# Patient Record
Sex: FEMALE | Race: BLACK OR AFRICAN AMERICAN | URBAN - METROPOLITAN AREA
[De-identification: names, ages, dates, MRNs, and addresses within clinical notes are randomized per-mention and may not be internally consistent; named-entity substitution may affect disease eponyms.]

---

## 2019-01-07 ENCOUNTER — OFFICE VISIT (OUTPATIENT)
Dept: INTERNAL MEDICINE CLINIC | Age: 59
End: 2019-01-07

## 2019-01-07 VITALS
SYSTOLIC BLOOD PRESSURE: 178 MMHG | RESPIRATION RATE: 16 BRPM | HEIGHT: 66 IN | WEIGHT: 188.9 LBS | BODY MASS INDEX: 30.36 KG/M2 | DIASTOLIC BLOOD PRESSURE: 100 MMHG | OXYGEN SATURATION: 98 % | TEMPERATURE: 97.8 F | HEART RATE: 83 BPM

## 2019-01-07 DIAGNOSIS — K13.0 LIP LESION: ICD-10-CM

## 2019-01-07 DIAGNOSIS — Z56.6 STRESS AT WORK: ICD-10-CM

## 2019-01-07 DIAGNOSIS — Z00.00 WELLNESS EXAMINATION: Primary | ICD-10-CM

## 2019-01-07 PROBLEM — I10 HTN (HYPERTENSION): Status: ACTIVE | Noted: 2019-01-07

## 2019-01-07 RX ORDER — AMLODIPINE BESYLATE 5 MG/1
TABLET ORAL
Qty: 90 TAB | Refills: 11 | Status: SHIPPED | OUTPATIENT
Start: 2019-01-07

## 2019-01-07 RX ORDER — LATANOPROST 50 UG/ML
1 SOLUTION/ DROPS OPHTHALMIC
COMMUNITY

## 2019-01-07 RX ORDER — AMLODIPINE BESYLATE 5 MG/1
5 TABLET ORAL DAILY
Qty: 30 TAB | Refills: 11 | Status: SHIPPED | OUTPATIENT
Start: 2019-01-07 | End: 2019-01-07 | Stop reason: SDUPTHER

## 2019-01-07 RX ORDER — LEVOTHYROXINE SODIUM 125 UG/1
TABLET ORAL
COMMUNITY
End: 2020-07-31 | Stop reason: SDUPTHER

## 2019-01-07 SDOH — HEALTH STABILITY - MENTAL HEALTH: OTHER PHYSICAL AND MENTAL STRAIN RELATED TO WORK: Z56.6

## 2019-01-07 NOTE — LETTER
NOTIFICATION RETURN TO WORK / SCHOOL 
 
1/7/2019 6:29 PM 
 
[facility]. Julio C[facility]ar [facility]atch [facility] [facility] [address] [facility] [id number] To Whom It May Concern: 
 
Julio César Hatch is currently under the care of Geo Mann and is incapacitated 01/07/2019 to 03/10/2019. She will return to work/school on 03/11/2019. If there are questions or concerns please have the patient contact our office. Sincerely, Jose West MD

## 2019-01-07 NOTE — LETTER
NOTIFICATION RETURN TO WORK / SCHOOL 
 
1/7/2019 6:24 PM 
 
Ms. Ashley Raygoza [address] [place] [place] [id number] To Whom It May Concern: 
 
Ashley Raygoza is currently under the care of Geo Mann on 01/07/2019 to 03/10/2019 She will return to work/school on 03/11/2019. If there are questions or concerns please have the patient contact our office. Sincerely, Estefanía Villanueva MD

## 2019-01-07 NOTE — PROGRESS NOTES
SPORTS MEDICINE AND PRIMARY CARE Eryn Vasquez MD, [address] [address] Phone:  [phone]  Fax: [phone] Chief Complaint Patient presents with  New Patient  
  patient concerned about stress and needs to be out of work for a while SUBJECTIVE: 
 
Floyd Marte is a 63 y.o. female Patient is seen today as a new patient for evaluation and ongoing care. Patient complains of stress related to work. She states she has 19 hours of sick leave and she usually manages her stress well, but with her new supervisor she is unable to manage the stress. She says that other employees have the same concern about the manager. She states she completes her work in a timely fashion and in spite of this has a great deal of stress related to the manager. She complains of a stress headache with tension in the frontal area. She notes increase in eating related to the stress. She is also becoming excessively sleepy, which she also relates to stress. She states she needs time off of work because of the severity of stress. Patient is seen for evaluation. Current Outpatient Medications Medication Sig Dispense Refill  levothyroxine (SYNTHROID) 125 mcg tablet Take  by mouth Daily (before breakfast).  latanoprost (XALATAN) 0.005 % ophthalmic solution Administer 1 Drop to both eyes nightly.  amLODIPine (NORVASC) 5 mg tablet Take 1 Tab by mouth daily. 30 Tab 11 Past Medical History:  
Diagnosis Date  Glaucoma   
 HTN (hypertension) 01/07/2019  Lesion of lip 01/07/2019  Stress at work 01/07/2019  Thyroid disease  Wellness examination 01/07/2019 History reviewed. No pertinent surgical history. No Known Allergies REVIEW OF SYSTEMS: 
General: negative for - chills or fever ENT: negative for - headaches, nasal congestion or tinnitus Respiratory: negative for - cough, hemoptysis, shortness of breath or wheezing Cardiovascular : negative for - chest pain, edema, palpitations or shortness of breath Gastrointestinal: negative for - abdominal pain, blood in stools, heartburn or nausea/vomiting Genito-Urinary: no dysuria, trouble voiding, or hematuria Musculoskeletal: negative for - gait disturbance, joint pain, joint stiffness or joint swelling Neurological: no TIA or stroke symptoms Hematologic: no bruises, no bleeding, no swollen glands Integument: no lumps, mole changes, nail changes or rash Endocrine:no malaise/lethargy or unexpected weight changes Social History Socioeconomic History  Marital status: SINGLE Spouse name: Not on file  Number of children: Not on file  Years of education: Not on file  Highest education level: Not on file Tobacco Use  Smoking status: Never Smoker  Smokeless tobacco: Never Used Substance and Sexual Activity  Drug use: No  
 Sexual activity: Yes No family history on file. Habits:  Patient is a lifetime nonsmoker, non drinker, non drug abuser. Social History:  The patient is  since 89. She has a 29year old son and an 25year old daughter and one grandchild. She completed her bachelor's degree at [employer] in business management. 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 She is a member of [employer]. Family History:  Father is 80 with PTSD, diabetes, and is at the [facility] currently. Mother is 68 with a stroke and hypertension. One sister, one brother, alive and well. OBJECTIVE:  
 
Visit Vitals BP (!) 178/100 Pulse 83 Temp 97.8 °F (36.6 °C) (Oral) Resp 16 Ht 5' 6\" (1.676 m) Wt 188 lb 14.4 oz (85.7 kg) SpO2 98% BMI 30.49 kg/m² CONSTITUTIONAL: well , well nourished, appears age appropriate EYES: perrla, eom intact ENMT:moist mucous membranes, pharynx clear NECK: supple.  Thyroid normal 
 RESPIRATORY: Chest: clear bilaterally CARDIOVASCULAR: Heart: regular rate and rhythm GASTROINTESTINAL: Abdomen: soft, bowel sounds active HEMATOLOGIC: no pathological lymph nodes palpated MUSCULOSKELETAL: Extremities: no edema, pulse 1+ INTEGUMENT: No unusual rashes or suspicious skin lesions noted. Nails appear normal. 
NEUROLOGIC: non-focal exam  
MENTAL STATUS: alert and oriented, appropriate affect No results found for any previous visit. ASSESSMENT:  
1. Wellness examination 2. Stress at work 3. Lip lesion Patient has a stress disorder related to work. Even talking about it causes her blood pressure to rise and when I check her blood pressure it is 194/92. We will place her on Amlodipine, although the medication may need to be decreased. If the stress is resolved by her staying off of work it may allow the blood pressure to approach a normotensive range. She has a pigmentary lesion on the upper lip to the right. I suspect this is trauma, but if it does not resolve we suggest she see a dermatologist. 
 
For the stress disorder we will refer her to psychiatry. I think we should keep her off work for at least two months to control her blood pressure, at which point if she needs to stay off longer because of stress she will need to get those notes from the psychiatrist.  Patient understands our position and agrees. She will return to the office in one month. We suggest she check her blood pressure once a week and if it is consistently greater than 160 she will see us sooner than one month. Appropriate laboratory studies will be requested. I have discussed the diagnosis with the patient and the intended plan as seen in the 
orders above. The patient understands and agees with the plan. The patient has  
received an after visit summary and questions were answered concerning 
future plans Patient labs and/or xrays were reviewed Past records were reviewed. PLAN: 
. Orders Placed This Encounter  ROSAMARIA MAMMO BI SCREENING INCL CAD  
 URINALYSIS W/ RFLX MICROSCOPIC  CBC WITH AUTOMATED DIFF  
 METABOLIC PANEL, COMPREHENSIVE  LIPID PANEL  
 TSH 3RD GENERATION  
 HEMOGLOBIN A1C WITH EAG  
 REFERRAL TO PSYCHIATRY  REFERRAL TO DERMATOLOGY  levothyroxine (SYNTHROID) 125 mcg tablet  latanoprost (XALATAN) 0.005 % ophthalmic solution  amLODIPine (NORVASC) 5 mg tablet Follow-up Disposition: 
Return in about 4 weeks (around 2/4/2019). ATTENTION:  
This medical record was transcribed using an electronic medical records system. Although proofread, it may and can contain electronic and spelling errors. Other human spelling and other errors may be present. Corrections may be executed at a later time. Please feel free to contact us for any clarifications as needed.

## 2019-01-07 NOTE — PROGRESS NOTES
Chief Complaint Patient presents with  New Patient  
  patient concerned about stress and needs to be out of work for a while 1. Have you been to the ER, urgent care clinic since your last visit? Hospitalized since your last visit? No 
 
2. Have you seen or consulted any other health care providers outside of the [address] [place] [address] since your last visit? Include any pap smears or colon screening.  No

## 2019-01-09 LAB
ALBUMIN SERPL-MCNC: 3.9 G/DL (ref 3.5–5.5)
ALBUMIN/GLOB SERPL: 1.3 {RATIO} (ref 1.2–2.2)
ALP SERPL-CCNC: 72 IU/L (ref 39–117)
ALT SERPL-CCNC: 26 IU/L (ref 0–32)
APPEARANCE UR: CLEAR
AST SERPL-CCNC: 29 IU/L (ref 0–40)
BASOPHILS # BLD AUTO: 0 X10E3/UL (ref 0–0.2)
BASOPHILS NFR BLD AUTO: 0 %
BILIRUB SERPL-MCNC: 0.3 MG/DL (ref 0–1.2)
BILIRUB UR QL STRIP: NEGATIVE
BUN SERPL-MCNC: 12 MG/DL (ref 6–24)
BUN/CREAT SERPL: 14 (ref 9–23)
CALCIUM SERPL-MCNC: 9.2 MG/DL (ref 8.7–10.2)
CHLORIDE SERPL-SCNC: 102 MMOL/L (ref 96–106)
CHOLEST SERPL-MCNC: 177 MG/DL (ref 100–199)
CO2 SERPL-SCNC: 25 MMOL/L (ref 20–29)
COLOR UR: YELLOW
CREAT SERPL-MCNC: 0.83 MG/DL (ref 0.57–1)
EOSINOPHIL # BLD AUTO: 0.1 X10E3/UL (ref 0–0.4)
EOSINOPHIL NFR BLD AUTO: 2 %
ERYTHROCYTE [DISTWIDTH] IN BLOOD BY AUTOMATED COUNT: 13.3 % (ref 12.3–15.4)
EST. AVERAGE GLUCOSE BLD GHB EST-MCNC: 108 MG/DL
GLOBULIN SER CALC-MCNC: 3 G/DL (ref 1.5–4.5)
GLUCOSE SERPL-MCNC: 88 MG/DL (ref 65–99)
GLUCOSE UR QL: NEGATIVE
HBA1C MFR BLD: 5.4 % (ref 4.8–5.6)
HCT VFR BLD AUTO: 36.6 % (ref 34–46.6)
HDLC SERPL-MCNC: 61 MG/DL
HGB BLD-MCNC: 11.9 G/DL (ref 11.1–15.9)
HGB UR QL STRIP: NEGATIVE
IMM GRANULOCYTES # BLD AUTO: 0 X10E3/UL (ref 0–0.1)
IMM GRANULOCYTES NFR BLD AUTO: 0 %
KETONES UR QL STRIP: NEGATIVE
LDLC SERPL CALC-MCNC: 107 MG/DL (ref 0–99)
LEUKOCYTE ESTERASE UR QL STRIP: NEGATIVE
LYMPHOCYTES # BLD AUTO: 1.9 X10E3/UL (ref 0.7–3.1)
LYMPHOCYTES NFR BLD AUTO: 38 %
MCH RBC QN AUTO: 29.8 PG (ref 26.6–33)
MCHC RBC AUTO-ENTMCNC: 32.5 G/DL (ref 31.5–35.7)
MCV RBC AUTO: 92 FL (ref 79–97)
MICRO URNS: NORMAL
MONOCYTES # BLD AUTO: 0.5 X10E3/UL (ref 0.1–0.9)
MONOCYTES NFR BLD AUTO: 10 %
NEUTROPHILS # BLD AUTO: 2.5 X10E3/UL (ref 1.4–7)
NEUTROPHILS NFR BLD AUTO: 50 %
NITRITE UR QL STRIP: NEGATIVE
PH UR STRIP: 6.5 [PH] (ref 5–7.5)
PLATELET # BLD AUTO: 285 X10E3/UL (ref 150–379)
POTASSIUM SERPL-SCNC: 4 MMOL/L (ref 3.5–5.2)
PROT SERPL-MCNC: 6.9 G/DL (ref 6–8.5)
PROT UR QL STRIP: NEGATIVE
RBC # BLD AUTO: 3.99 X10E6/UL (ref 3.77–5.28)
SODIUM SERPL-SCNC: 139 MMOL/L (ref 134–144)
SP GR UR: 1.01 (ref 1–1.03)
TRIGL SERPL-MCNC: 44 MG/DL (ref 0–149)
TSH SERPL DL<=0.005 MIU/L-ACNC: 0.78 UIU/ML (ref 0.45–4.5)
UROBILINOGEN UR STRIP-MCNC: 0.2 MG/DL (ref 0.2–1)
VLDLC SERPL CALC-MCNC: 9 MG/DL (ref 5–40)
WBC # BLD AUTO: 5 X10E3/UL (ref 3.4–10.8)

## 2019-01-29 ENCOUNTER — OFFICE VISIT (OUTPATIENT)
Dept: INTERNAL MEDICINE CLINIC | Age: 59
End: 2019-01-29

## 2019-01-29 VITALS
TEMPERATURE: 98.2 F | OXYGEN SATURATION: 97 % | DIASTOLIC BLOOD PRESSURE: 97 MMHG | BODY MASS INDEX: 30.39 KG/M2 | RESPIRATION RATE: 18 BRPM | WEIGHT: 189.1 LBS | SYSTOLIC BLOOD PRESSURE: 168 MMHG | HEART RATE: 72 BPM | HEIGHT: 66 IN

## 2019-01-29 DIAGNOSIS — Z56.6 STRESS AT WORK: ICD-10-CM

## 2019-01-29 DIAGNOSIS — Z98.890 S/P COLONOSCOPY: ICD-10-CM

## 2019-01-29 DIAGNOSIS — I10 ESSENTIAL HYPERTENSION: Primary | ICD-10-CM

## 2019-01-29 RX ORDER — TRAZODONE HYDROCHLORIDE 50 MG/1
50 TABLET ORAL
Qty: 60 TAB | Refills: 3 | Status: SHIPPED | OUTPATIENT
Start: 2019-01-29 | End: 2019-01-29 | Stop reason: SDUPTHER

## 2019-01-29 RX ORDER — TRAZODONE HYDROCHLORIDE 50 MG/1
TABLET ORAL
Qty: 90 TAB | Refills: 3 | Status: SHIPPED | OUTPATIENT
Start: 2019-01-29

## 2019-01-29 SDOH — HEALTH STABILITY - MENTAL HEALTH: OTHER PHYSICAL AND MENTAL STRAIN RELATED TO WORK: Z56.6

## 2019-01-29 NOTE — PROGRESS NOTES
1. Have you been to the ER, urgent care clinic since your last visit? Hospitalized since your last visit? No 
 
2. Have you seen or consulted any other health care providers outside of the [address] [place] [address] since your last visit? Include any pap smears or colon screening. No  
 
Requesting medication for anxiety

## 2019-01-29 NOTE — PROGRESS NOTES
SPORTS MEDICINE AND PRIMARY CARE Robert Trivedi MD, [address] [address] Phone:  [phone]  Fax: [phone] Chief Complaint Patient presents with  Hypertension f/u SUBECTIVE: 
 
Baltazar Green is a 63 y.o. female Patient with known history of glaucoma, hypertension, off work currently due to stress related to work. Patient returns today still dealing with the stress disorder related to her supervisor. She states that whenever she talks to her  about it she feels full. She would like something for stress. She has an appointment to see Dr. Angelia Camilo in May. Current Outpatient Medications Medication Sig Dispense Refill  traZODone (DESYREL) 50 mg tablet Take 1 Tab by mouth nightly. 60 Tab 3  
 levothyroxine (SYNTHROID) 125 mcg tablet Take  by mouth Daily (before breakfast).  latanoprost (XALATAN) 0.005 % ophthalmic solution Administer 1 Drop to both eyes nightly.  amLODIPine (NORVASC) 5 mg tablet TAKE 1 TABLET BY MOUTH DAILY 90 Tab 11 Past Medical History:  
Diagnosis Date  Glaucoma   
 HTN (hypertension) 01/07/2019  Lesion of lip 01/07/2019  S/P colonoscopy 2012  
 at [place]  Stress at work 01/07/2019  Thyroid disease  Wellness examination 01/07/2019 History reviewed. No pertinent surgical history. No Known Allergies REVIEW OF SYSTEMS: 
 No chest pain, no shortness of breath. Social History Socioeconomic History  Marital status: SINGLE Spouse name: Not on file  Number of children: Not on file  Years of education: Not on file  Highest education level: Not on file Tobacco Use  Smoking status: Never Smoker  Smokeless tobacco: Never Used Substance and Sexual Activity  Drug use: No  
 Sexual activity: Yes  
Social History Narrative  Habits:  Patient is a lifetime nonsmoker, non drinker, non drug abuser.  
    
 Social History:  The patient is  since 89. She has a 29year old son and an 25year old daughter and one grandchild. She completed her bachelor's degree at [employer] in business management. 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 She is a member of [employer].  
    
 Family History:  Father is 80 with PTSD, diabetes, and is at the [facility] currently. Mother is 68 with a stroke and hypertension. One sister, one brother, alive and well.  
r 
History reviewed. No pertinent family history. OBJECTIVE: 
Visit Vitals BP (!) 168/97 Pulse 72 Temp 98.2 °F (36.8 °C) (Oral) Resp 18 Ht 5' 6\" (1.676 m) Wt 189 lb 1.6 oz (85.8 kg) SpO2 97% BMI 30.52 kg/m² ENT: perrla,  eom intact NECK: supple. Thyroid normal 
CHEST: clear to ascultation and percussion HEART: regular rate and rhythm ABD: soft, bowel sounds active EXTREMITIES: no edema, pulse 1+ Office Visit on 01/07/2019 Component Date Value Ref Range Status  Specific Gravity 01/07/2019 1.007  1.005 - 1.030 Final  
 pH (UA) 01/07/2019 6.5  5.0 - 7.5 Final  
 Color 01/07/2019 Yellow  Yellow Final  
 Appearance 01/07/2019 Clear  Clear Final  
 Leukocyte Esterase 01/07/2019 Negative  Negative Final  
 Protein 01/07/2019 Negative  Negative/Trace Final  
 Glucose 01/07/2019 Negative  Negative Final  
 Ketone 01/07/2019 Negative  Negative Final  
 Blood 01/07/2019 Negative  Negative Final  
 Bilirubin 01/07/2019 Negative  Negative Final  
 Urobilinogen 01/07/2019 0.2  0.2 - 1.0 mg/dL Final  
 Nitrites 01/07/2019 Negative  Negative Final  
 Microscopic Examination 01/07/2019 Comment   Final  
 Microscopic not indicated and not performed.   
 WBC 01/07/2019 5.0  3.4 - 10.8 x10E3/uL Final  
 RBC 01/07/2019 3.99  3.77 - 5.28 x10E6/uL Final  
 HGB 01/07/2019 11.9  11.1 - 15.9 g/dL Final  
 HCT 01/07/2019 36.6  34.0 - 46.6 % Final  
  MCV 01/07/2019 92  79 - 97 fL Final  
 MCH 01/07/2019 29.8  26.6 - 33.0 pg Final  
 MCHC 01/07/2019 32.5  31.5 - 35.7 g/dL Final  
 RDW 01/07/2019 13.3  12.3 - 15.4 % Final  
 PLATELET 43/77/9580 202  150 - 379 x10E3/uL Final  
 NEUTROPHILS 01/07/2019 50  Not Estab. % Final  
 Lymphocytes 01/07/2019 38  Not Estab. % Final  
 MONOCYTES 01/07/2019 10  Not Estab. % Final  
 EOSINOPHILS 01/07/2019 2  Not Estab. % Final  
 BASOPHILS 01/07/2019 0  Not Estab. % Final  
 ABS. NEUTROPHILS 01/07/2019 2.5  1.4 - 7.0 x10E3/uL Final  
 Abs Lymphocytes 01/07/2019 1.9  0.7 - 3.1 x10E3/uL Final  
 ABS. MONOCYTES 01/07/2019 0.5  0.1 - 0.9 x10E3/uL Final  
 ABS. EOSINOPHILS 01/07/2019 0.1  0.0 - 0.4 x10E3/uL Final  
 ABS. BASOPHILS 01/07/2019 0.0  0.0 - 0.2 x10E3/uL Final  
 IMMATURE GRANULOCYTES 01/07/2019 0  Not Estab. % Final  
 ABS. IMM. GRANS. 01/07/2019 0.0  0.0 - 0.1 x10E3/uL Final  
 Glucose 01/07/2019 88  65 - 99 mg/dL Final  
 BUN 01/07/2019 12  6 - 24 mg/dL Final  
 Creatinine 01/07/2019 0.83  0.57 - 1.00 mg/dL Final  
 GFR est non-AA 01/07/2019 79  >59 mL/min/1.73 Final  
 GFR est AA 01/07/2019 91  >59 mL/min/1.73 Final  
 BUN/Creatinine ratio 01/07/2019 14  9 - 23 Final  
 Sodium 01/07/2019 139  134 - 144 mmol/L Final  
 Potassium 01/07/2019 4.0  3.5 - 5.2 mmol/L Final  
 Chloride 01/07/2019 102  96 - 106 mmol/L Final  
 CO2 01/07/2019 25  20 - 29 mmol/L Final  
 Calcium 01/07/2019 9.2  8.7 - 10.2 mg/dL Final  
 Protein, total 01/07/2019 6.9  6.0 - 8.5 g/dL Final  
 Albumin 01/07/2019 3.9  3.5 - 5.5 g/dL Final  
 GLOBULIN, TOTAL 01/07/2019 3.0  1.5 - 4.5 g/dL Final  
 A-G Ratio 01/07/2019 1.3  1.2 - 2.2 Final  
 Bilirubin, total 01/07/2019 0.3  0.0 - 1.2 mg/dL Final  
 Alk.  phosphatase 01/07/2019 72  39 - 117 IU/L Final  
 AST (SGOT) 01/07/2019 29  0 - 40 IU/L Final  
 ALT (SGPT) 01/07/2019 26  0 - 32 IU/L Final  
 Cholesterol, total 01/07/2019 177  100 - 199 mg/dL Final  
  Triglyceride 01/07/2019 44  0 - 149 mg/dL Final  
 HDL Cholesterol 01/07/2019 61  >39 mg/dL Final  
 VLDL, calculated 01/07/2019 9  5 - 40 mg/dL Final  
 LDL, calculated 01/07/2019 107* 0 - 99 mg/dL Final  
 TSH 01/07/2019 0.781  0.450 - 4.500 uIU/mL Final  
 Hemoglobin A1c 01/07/2019 5.4  4.8 - 5.6 % Final  
 Comment:          Prediabetes: 5.7 - 6.4 Diabetes: >6.4 Glycemic control for adults with diabetes: <7.0  Estimated average glucose 01/07/2019 108  mg/dL Final  
 
  
 
ASSESSMENT: 
1. Essential hypertension 2. Stress at work 3. S/P colonoscopy Patient has typical white coat hypertension. She states that her blood pressure has been in the 120-130 range at home. Therefore no adjustment will be made in the medication. She is still dealing with the stresses at work. Will add Trazodone to her current regimen, asked her to take it at bedtime, which will also help her rest at night. She has an appointment to see us again the last part of February and at that time if an adjustment needs to be made in her medication we will make further adjustments. She is invited to walk in to see us at any time should she have an urgency and unable to get an appointment. I have discussed the diagnosis with the patient and the intended plan as seen in the 
orders above. The patient understands and agees with the plan. The patient has  
received an after visit summary and questions were answered concerning 
future plans Patient labs and/or xrays were reviewed Past records were reviewed. PLAN: 
. Orders Placed This Encounter  traZODone (DESYREL) 50 mg tablet Follow-up Disposition: 
Return in about 3 weeks (around 2/20/2019). ATTENTION:  
This medical record was transcribed using an electronic medical records system. Although proofread, it may and can contain electronic and spelling errors. Other human spelling and other errors may be present. Corrections may be executed at a later time. Please feel free to contact us for any clarifications as needed.

## 2019-02-26 ENCOUNTER — OFFICE VISIT (OUTPATIENT)
Dept: INTERNAL MEDICINE CLINIC | Age: 59
End: 2019-02-26

## 2019-02-26 VITALS
SYSTOLIC BLOOD PRESSURE: 148 MMHG | HEART RATE: 75 BPM | OXYGEN SATURATION: 97 % | TEMPERATURE: 98.1 F | RESPIRATION RATE: 18 BRPM | WEIGHT: 185.6 LBS | HEIGHT: 66 IN | DIASTOLIC BLOOD PRESSURE: 91 MMHG | BODY MASS INDEX: 29.83 KG/M2

## 2019-02-26 DIAGNOSIS — I10 ESSENTIAL HYPERTENSION: Primary | ICD-10-CM

## 2019-02-26 DIAGNOSIS — Z56.6 STRESS AT WORK: ICD-10-CM

## 2019-02-26 SDOH — HEALTH STABILITY - MENTAL HEALTH: OTHER PHYSICAL AND MENTAL STRAIN RELATED TO WORK: Z56.6

## 2019-02-26 NOTE — PROGRESS NOTES
SPORTS MEDICINE AND PRIMARY CARE Alem Khan MD, [address] [address] Phone:  [phone]  Fax: [phone] Chief Complaint Patient presents with  Hypertension SUBECTIVE: 
 
Tank Gloria is a 63 y.o. female Patient returns today with history of hypertension, thyroid disease, glaucoma, stress at work, and is seen for evaluation. States that she is not ready to go back to work. She does not see the psychiatrist until May 26th with  _____. She could not get an earlier appointment. She does not want to go back to work. Current Outpatient Medications Medication Sig Dispense Refill  traZODone (DESYREL) 50 mg tablet TAKE 1 TABLET BY MOUTH EVERY NIGHT 90 Tab 3  
 levothyroxine (SYNTHROID) 125 mcg tablet Take  by mouth Daily (before breakfast).  latanoprost (XALATAN) 0.005 % ophthalmic solution Administer 1 Drop to both eyes nightly.  amLODIPine (NORVASC) 5 mg tablet TAKE 1 TABLET BY MOUTH DAILY 90 Tab 11 Past Medical History:  
Diagnosis Date  Glaucoma   
 HTN (hypertension) 01/07/2019  Lesion of lip 01/07/2019  S/P colonoscopy 2012  
 at [place]  Stress at work 01/07/2019  Thyroid disease  Wellness examination 01/07/2019 History reviewed. No pertinent surgical history. No Known Allergies REVIEW OF SYSTEMS: 
 No chest pain, no shortness of breath. Social History Socioeconomic History  Marital status: SINGLE Spouse name: Not on file  Number of children: Not on file  Years of education: Not on file  Highest education level: Not on file Tobacco Use  Smoking status: Never Smoker  Smokeless tobacco: Never Used Substance and Sexual Activity  Alcohol use: No  
  Frequency: Never  Drug use: No  
 Sexual activity: Yes  
  Partners: Male Social History Narrative  Habits:  Patient is a lifetime nonsmoker, non drinker, non drug abuser.  
    
 Social History:  The patient is  since 89. She has a 29year old son and an 25year old daughter and one grandchild. 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 She is a member of [employer].  
    
 Family History:  Father is 80 with PTSD, diabetes, and is at the [facility] currently. Mother is 68 with a stroke and hypertension. One sister, one brother, alive and well.  
r 
Family History Problem Relation Age of Onset  Stroke Mother  Hypertension Mother  Hypertension Father  Diabetes Father OBJECTIVE: 
Visit Vitals BP (!) 148/91 Pulse 75 Temp 98.1 °F (36.7 °C) (Oral) Resp 18 Ht 5' 6\" (1.676 m) Wt 185 lb 9.6 oz (84.2 kg) SpO2 97% BMI 29.96 kg/m² ENT: perrla,  eom intact NECK: supple. Thyroid normal 
CHEST: clear to ascultation and percussion HEART: regular rate and rhythm ABD: soft, bowel sounds active EXTREMITIES: no edema, pulse 1+ Office Visit on 01/07/2019 Component Date Value Ref Range Status  Specific Gravity 01/07/2019 1.007  1.005 - 1.030 Final  
 pH (UA) 01/07/2019 6.5  5.0 - 7.5 Final  
 Color 01/07/2019 Yellow  Yellow Final  
 Appearance 01/07/2019 Clear  Clear Final  
 Leukocyte Esterase 01/07/2019 Negative  Negative Final  
 Protein 01/07/2019 Negative  Negative/Trace Final  
 Glucose 01/07/2019 Negative  Negative Final  
 Ketone 01/07/2019 Negative  Negative Final  
 Blood 01/07/2019 Negative  Negative Final  
 Bilirubin 01/07/2019 Negative  Negative Final  
 Urobilinogen 01/07/2019 0.2  0.2 - 1.0 mg/dL Final  
 Nitrites 01/07/2019 Negative  Negative Final  
 Microscopic Examination 01/07/2019 Comment   Final  
 Microscopic not indicated and not performed.   
 WBC 01/07/2019 5.0  3.4 - 10.8 x10E3/uL Final  
 RBC 01/07/2019 3.99  3.77 - 5.28 x10E6/uL Final  
  HGB 01/07/2019 11.9  11.1 - 15.9 g/dL Final  
 HCT 01/07/2019 36.6  34.0 - 46.6 % Final  
 MCV 01/07/2019 92  79 - 97 fL Final  
 MCH 01/07/2019 29.8  26.6 - 33.0 pg Final  
 MCHC 01/07/2019 32.5  31.5 - 35.7 g/dL Final  
 RDW 01/07/2019 13.3  12.3 - 15.4 % Final  
 PLATELET 69/09/9335 570  150 - 379 x10E3/uL Final  
 NEUTROPHILS 01/07/2019 50  Not Estab. % Final  
 Lymphocytes 01/07/2019 38  Not Estab. % Final  
 MONOCYTES 01/07/2019 10  Not Estab. % Final  
 EOSINOPHILS 01/07/2019 2  Not Estab. % Final  
 BASOPHILS 01/07/2019 0  Not Estab. % Final  
 ABS. NEUTROPHILS 01/07/2019 2.5  1.4 - 7.0 x10E3/uL Final  
 Abs Lymphocytes 01/07/2019 1.9  0.7 - 3.1 x10E3/uL Final  
 ABS. MONOCYTES 01/07/2019 0.5  0.1 - 0.9 x10E3/uL Final  
 ABS. EOSINOPHILS 01/07/2019 0.1  0.0 - 0.4 x10E3/uL Final  
 ABS. BASOPHILS 01/07/2019 0.0  0.0 - 0.2 x10E3/uL Final  
 IMMATURE GRANULOCYTES 01/07/2019 0  Not Estab. % Final  
 ABS. IMM. GRANS. 01/07/2019 0.0  0.0 - 0.1 x10E3/uL Final  
 Glucose 01/07/2019 88  65 - 99 mg/dL Final  
 BUN 01/07/2019 12  6 - 24 mg/dL Final  
 Creatinine 01/07/2019 0.83  0.57 - 1.00 mg/dL Final  
 GFR est non-AA 01/07/2019 79  >59 mL/min/1.73 Final  
 GFR est AA 01/07/2019 91  >59 mL/min/1.73 Final  
 BUN/Creatinine ratio 01/07/2019 14  9 - 23 Final  
 Sodium 01/07/2019 139  134 - 144 mmol/L Final  
 Potassium 01/07/2019 4.0  3.5 - 5.2 mmol/L Final  
 Chloride 01/07/2019 102  96 - 106 mmol/L Final  
 CO2 01/07/2019 25  20 - 29 mmol/L Final  
 Calcium 01/07/2019 9.2  8.7 - 10.2 mg/dL Final  
 Protein, total 01/07/2019 6.9  6.0 - 8.5 g/dL Final  
 Albumin 01/07/2019 3.9  3.5 - 5.5 g/dL Final  
 GLOBULIN, TOTAL 01/07/2019 3.0  1.5 - 4.5 g/dL Final  
 A-G Ratio 01/07/2019 1.3  1.2 - 2.2 Final  
 Bilirubin, total 01/07/2019 0.3  0.0 - 1.2 mg/dL Final  
 Alk.  phosphatase 01/07/2019 72  39 - 117 IU/L Final  
 AST (SGOT) 01/07/2019 29  0 - 40 IU/L Final  
  ALT (SGPT) 01/07/2019 26  0 - 32 IU/L Final  
 Cholesterol, total 01/07/2019 177  100 - 199 mg/dL Final  
 Triglyceride 01/07/2019 44  0 - 149 mg/dL Final  
 HDL Cholesterol 01/07/2019 61  >39 mg/dL Final  
 VLDL, calculated 01/07/2019 9  5 - 40 mg/dL Final  
 LDL, calculated 01/07/2019 107* 0 - 99 mg/dL Final  
 TSH 01/07/2019 0.781  0.450 - 4.500 uIU/mL Final  
 Hemoglobin A1c 01/07/2019 5.4  4.8 - 5.6 % Final  
 Comment:          Prediabetes: 5.7 - 6.4 Diabetes: >6.4 Glycemic control for adults with diabetes: <7.0  Estimated average glucose 01/07/2019 108  mg/dL Final  
 
  
 
ASSESSMENT: 
No diagnosis found. She states that just talking about the stressors related to work raise her blood pressure and she is really not ready to go back to work. She wants to be out of work until she sees the psychiatrist.  We give her a note to that effect with a diagnosis of stress related to work. Laboratory studies are reviewed and are unremarkable. Her blood pressure is noted to be elevated today at 148/91, repeat blood pressure is 220/80. She attributes this to the fact that we discussed return to work. She states when her mom talks about her going back to work and the supervisor that her blood pressure also goes up. When she is calm and in right orientation her blood pressure is in the 120s-130s, therefore no adjustments will be made. She will be back to see us in three months, sooner if she needs to. Patient desires a note to return to work when she does return to work, not to work under her current supervisor's jurisdiction, that is not to work _____. I have discussed the diagnosis with the patient and the intended plan as seen in the 
orders above. The patient understands and agees with the plan. The patient has  
received an after visit summary and questions were answered concerning 
future plans Patient labs and/or xrays were reviewed Past records were reviewed. PLAN: 
 
Follow-up Disposition: 
Return in about 3 months (around 5/26/2019). ATTENTION:  
This medical record was transcribed using an electronic medical records system. Although proofread, it may and can contain electronic and spelling errors. Other human spelling and other errors may be present. Corrections may be executed at a later time. Please feel free to contact us for any clarifications as needed.

## 2019-02-26 NOTE — PROGRESS NOTES
1. Have you been to the ER, urgent care clinic since your last visit? Hospitalized since your last visit? No 
 
2. Have you seen or consulted any other health care providers outside of the [address] [place] [address] since your last visit? Include any pap smears or colon screening. No  
 
Wants to discuss work

## 2019-05-24 ENCOUNTER — OFFICE VISIT (OUTPATIENT)
Dept: INTERNAL MEDICINE CLINIC | Age: 59
End: 2019-05-24

## 2019-05-24 VITALS
OXYGEN SATURATION: 95 % | SYSTOLIC BLOOD PRESSURE: 162 MMHG | HEIGHT: 66 IN | WEIGHT: 186.2 LBS | TEMPERATURE: 98.3 F | HEART RATE: 85 BPM | BODY MASS INDEX: 29.92 KG/M2 | DIASTOLIC BLOOD PRESSURE: 100 MMHG | RESPIRATION RATE: 18 BRPM

## 2019-05-24 DIAGNOSIS — I10 ESSENTIAL HYPERTENSION: Primary | ICD-10-CM

## 2019-05-24 DIAGNOSIS — Z56.6 STRESS AT WORK: ICD-10-CM

## 2019-05-24 SDOH — HEALTH STABILITY - MENTAL HEALTH: OTHER PHYSICAL AND MENTAL STRAIN RELATED TO WORK: Z56.6

## 2019-05-24 NOTE — PROGRESS NOTES
[facility]  Sandoval Grey MD, [address] [address]  Phone:  [phone]  Fax: [phone]       Chief Complaint   Patient presents with    Hypertension     follow up    . SUBJECTIVE:    Yi Cano is a 63 y.o. female Patient returns today with known history of primary hypertension, stress at work, and is seen for evaluation. Patient returns today saying she is ready to go back to work. She was supposed to see Dr. Anibal Mcneill. She was on the way there when the office called her and said she was supposed to be there at 2:30, her paper clearly said 3:00, and they would not let her see the physician and further told her that the physician would no longer see patients because he was leaving the practice, and they scheduled her to see a new doctor in July, the appointment of which she has taken. However she feels that she has recovered adequately to return to work and is therefore requesting a return to work slip for Tuesday. Her last day of work was on January 7th, 2019. Current Outpatient Medications   Medication Sig Dispense Refill    traZODone (DESYREL) 50 mg tablet TAKE 1 TABLET BY MOUTH EVERY NIGHT 90 Tab 3    levothyroxine (SYNTHROID) 125 mcg tablet Take  by mouth Daily (before breakfast).  latanoprost (XALATAN) 0.005 % ophthalmic solution Administer 1 Drop to both eyes nightly.  amLODIPine (NORVASC) 5 mg tablet TAKE 1 TABLET BY MOUTH DAILY 90 Tab 11     Past Medical History:   Diagnosis Date    Glaucoma     HTN (hypertension) 01/07/2019    Lesion of lip 01/07/2019    S/P colonoscopy 2012    at [place]    Stress at work 01/07/2019    Thyroid disease     Wellness examination 01/07/2019     History reviewed. No pertinent surgical history.   No Known Allergies      REVIEW OF SYSTEMS:  General: negative for - chills or fever  ENT: negative for - headaches, nasal congestion or tinnitus  Respiratory: negative for - cough, hemoptysis, shortness of breath or wheezing  Cardiovascular : negative for - chest pain, edema, palpitations or shortness of breath  Gastrointestinal: negative for - abdominal pain, blood in stools, heartburn or nausea/vomiting  Genito-Urinary: no dysuria, trouble voiding, or hematuria  Musculoskeletal: negative for - gait disturbance, joint pain, joint stiffness or joint swelling  Neurological: no TIA or stroke symptoms  Hematologic: no bruises, no bleeding, no swollen glands  Integument: no lumps, mole changes, nail changes or rash  Endocrine: no malaise/lethargy or unexpected weight changes      Social History     Socioeconomic History    Marital status: SINGLE     Spouse name: Not on file    Number of children: Not on file    Years of education: Not on file    Highest education level: Not on file   Tobacco Use    Smoking status: Never Smoker    Smokeless tobacco: Never Used   Substance and Sexual Activity    Alcohol use: No     Frequency: Never    Drug use: No    Sexual activity: Yes     Partners: Male   Social History Narrative    Habits:  Patient is a lifetime nonsmoker, non drinker, non drug abuser.         Social History:  The patient is  since [birth year]. She has a 29year old son and an 25year old daughter and one grandchild. She completed her bachelor's degree at [employer] in business management. 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 She is a member of [employer].         Family History:  Father is 80 with PTSD, diabetes, and is at the [facility] currently. Mother is 68 with a stroke and hypertension. One sister, one brother, alive and well.      Family History   Problem Relation Age of Onset   [facility] Stroke Mother     Hypertension Mother     Hypertension Father     Diabetes Father        OBJECTIVE:    Visit Vitals  BP (!) 162/100   Pulse 85   Temp 98.3 °F (36.8 °C) (Oral)   Resp 18   Ht 5' 6\" (1.676 m)   Wt 186 lb 3.2 oz (84.5 kg)   SpO2 95%   BMI 30.05 kg/m²     CONSTITUTIONAL: well , well nourished, appears age appropriate  EYES: perrla, eom intact  ENMT:moist mucous membranes, pharynx clear  NECK: supple. Thyroid normal  RESPIRATORY: Chest: clear bilaterally   CARDIOVASCULAR: Heart: regular rate and rhythm  GASTROINTESTINAL: Abdomen: soft, bowel sounds active  HEMATOLOGIC: no pathological lymph nodes palpated  MUSCULOSKELETAL: Extremities: no edema, pulse 1+   INTEGUMENT: No unusual rashes or suspicious skin lesions noted. Nails appear normal.  NEUROLOGIC: non-focal exam   MENTAL STATUS: alert and oriented, appropriate affect           ASSESSMENT:  1. Essential hypertension    2. Stress at work      Patient remains stressed about even going back to work. We note that her BP was elevated when she came to the office and has gone up by 20 points in just talking about returning to work. The supervisor is still present and still hassling the other employees and she expects to have the same hassle when she returns. We advise her if the stress level again becomes too high for her to handle then she should let us know and we will certainly take her out of work. We encourage her to keep the follow up appointment with the psychiatrist for other mechanisms of dealing with the stressors of her job. We are disappointed with the weight gain when she had time to exercise and do things to get the weight off and she did not take advantage of that time. She will be back to see us in three to four months or as needed. I have discussed the diagnosis with the patient and the intended plan as seen in the  orders above. The patient understands and agees with the plan. The patient has   received an after visit summary and questions were answered concerning  future plans  Patient labs and/or xrays were reviewed  Past records were reviewed. PLAN:  . No orders of the defined types were placed in this encounter. Follow-up and Dispositions    · Return in about 4 months (around 9/24/2019). ATTENTION:   This medical record was transcribed using an electronic medical records system. Although proofread, it may and can contain electronic and spelling errors. Other human spelling and other errors may be present. Corrections may be executed at a later time. Please feel free to contact us for any clarifications as needed.

## 2019-05-24 NOTE — PROGRESS NOTES
Chief Complaint   Patient presents with    Hypertension     follow up      1. Have you been to the ER, urgent care clinic since your last visit? Hospitalized since your last visit? No    2. Have you seen or consulted any other health care providers outside of the [address] [place] [address] since your last visit? Include any pap smears or colon screening.  No

## 2019-09-23 PROBLEM — Z00.00 WELLNESS EXAMINATION: Status: RESOLVED | Noted: 2019-01-07 | Resolved: 2019-09-23

## 2020-07-31 ENCOUNTER — VIRTUAL VISIT (OUTPATIENT)
Dept: FAMILY MEDICINE CLINIC | Age: 60
End: 2020-07-31

## 2020-07-31 DIAGNOSIS — Z76.89 ENCOUNTER TO ESTABLISH CARE: ICD-10-CM

## 2020-07-31 DIAGNOSIS — E03.9 ACQUIRED HYPOTHYROIDISM: Primary | ICD-10-CM

## 2020-07-31 RX ORDER — LEVOTHYROXINE SODIUM 125 UG/1
125 TABLET ORAL
Qty: 30 TAB | Refills: 0 | Status: SHIPPED | OUTPATIENT
Start: 2020-07-31 | End: 2020-07-31

## 2020-07-31 RX ORDER — LEVOTHYROXINE SODIUM 125 UG/1
TABLET ORAL
Qty: 30 TAB | Refills: 0 | Status: SHIPPED | OUTPATIENT
Start: 2020-07-31 | End: 2020-12-22

## 2020-07-31 RX ORDER — LEVOTHYROXINE SODIUM 125 UG/1
TABLET ORAL
Qty: 30 TAB | Refills: 0 | Status: SHIPPED | OUTPATIENT
Start: 2020-07-31 | End: 2020-07-31

## 2020-07-31 RX ORDER — LEVOTHYROXINE SODIUM 125 UG/1
125 TABLET ORAL
Qty: 30 TAB | Refills: 2 | Status: SHIPPED | OUTPATIENT
Start: 2020-07-31 | End: 2020-07-31 | Stop reason: SDUPTHER

## 2020-07-31 NOTE — PROGRESS NOTES
Joo Herzog is a 62 y.o. female, evaluated via audio-only technology on 7/31/2020 for [address] and Medication Refill    Patient was conferenced with by telephone communication only. Patient was unable to establish the video virtual dialogue on her phone. The patient has not been seen in our practice since 2017. The patient has been seeing an endocrinologist to manage her hypothyroidism and recently the practice closed without any forewarning. The patient is going out of town and has and will be going to run out of her medication this week. I have refilled the patient's Synthroid and confirm the dose with the patient. The patient agrees to come in to have lab work done to establish care and a baseline of her thyroid and other functions due to being new to the practice again. The patient reports no complaints and feels well agrees to come in for labs and will be followed up with after that. Assessment & Plan:   Diagnoses and all orders for this visit:    1. Acquired hypothyroidism  -     levothyroxine (SYNTHROID) 125 mcg tablet; Take 1 Tab by mouth Daily (before breakfast) for 90 days. Indications: a condition with low thyroid hormone levels      Orders Placed This Encounter    levothyroxine (SYNTHROID) 125 mcg tablet     Sig: Take 1 Tab by mouth Daily (before breakfast) for 90 days. Indications: a condition with low thyroid hormone levels     Dispense:  30 Tab     Refill:  2   ient was seen by telephone visit today patient was seen today y telephone visit only. 12  Subjective:       Prior to Admission medications    Medication Sig Start Date End Date Taking? Authorizing Provider   levothyroxine (SYNTHROID) 125 mcg tablet Take 1 Tab by mouth Daily (before breakfast) for 90 days.  Indications: a condition with low thyroid hormone levels 7/31/20 10/29/20 Yes Jono Willis NP   traZODone (DESYREL) 50 mg tablet TAKE 1 TABLET BY MOUTH EVERY NIGHT 1/29/19  Yes Arlinda Cabot, MD   latanoprost (XALATAN) 0.005 % ophthalmic solution Administer 1 Drop to both eyes nightly. Yes Provider, Historical   levothyroxine (SYNTHROID) 125 mcg tablet Take  by mouth Daily (before breakfast). 7/31/20 Yes Provider, Historical   levothyroxine (SYNTHROID) 125 mcg tablet Take 1 Tab by mouth Daily (before breakfast). 7/31/20   Arlinda Cabot, MD   amLODIPine (NORVASC) 5 mg tablet TAKE 1 TABLET BY MOUTH DAILY 1/7/19   Arlinda Cabot, MD     Patient Active Problem List   Diagnosis Code    HTN (hypertension) I10    Stress at work Z56.6    Lesion of lip K13.0    S/P colonoscopy [id number]     Patient Active Problem List    Diagnosis Date Noted    HTN (hypertension) 01/07/2019    Stress at work 01/07/2019    Lesion of lip 01/07/2019    S/P colonoscopy 01/01/2012     Current Outpatient Medications   Medication Sig Dispense Refill    levothyroxine (SYNTHROID) 125 mcg tablet Take 1 Tab by mouth Daily (before breakfast) for 90 days. Indications: a condition with low thyroid hormone levels 30 Tab 2    traZODone (DESYREL) 50 mg tablet TAKE 1 TABLET BY MOUTH EVERY NIGHT 90 Tab 3    latanoprost (XALATAN) 0.005 % ophthalmic solution Administer 1 Drop to both eyes nightly.  levothyroxine (SYNTHROID) 125 mcg tablet Take 1 Tab by mouth Daily (before breakfast). 30 Tab 0    amLODIPine (NORVASC) 5 mg tablet TAKE 1 TABLET BY MOUTH DAILY 90 Tab 11     No Known Allergies  Past Medical History:   Diagnosis Date    Glaucoma     HTN (hypertension) 01/07/2019    Lesion of lip 01/07/2019    S/P colonoscopy 2012    at [place]    Stress at work 01/07/2019    Thyroid disease     Wellness examination 01/07/2019       Review of Systems   Constitutional: Negative. HENT: Negative. Eyes: Negative. Respiratory: Negative. Cardiovascular: Negative. Gastrointestinal: Negative. Genitourinary: Negative. Musculoskeletal: Negative. Skin: Negative. Neurological: Negative.     Endo/Heme/Allergies: Negative. Psychiatric/Behavioral: Negative. Patient-Reported Vitals 7/31/2020   Patient-Reported Weight 180lb   Patient-Reported Height 5ft6in        Sonia Myers, who was evaluated through a patient-initiated, synchronous (real-time) audio only encounter, and/or her healthcare decision maker, is aware that it is a billable service, with coverage as determined by her insurance carrier. She provided verbal consent to proceed: Yes. She has not had a related appointment within my department in the past 7 days or scheduled within the next 24 hours.       Total Time: minutes: 11-20 minutes    Marcio López NP

## 2020-07-31 NOTE — PROGRESS NOTES
Identified pt with two pt identifiers(name and ). Reviewed record in preparation for visit and have obtained necessary documentation. Chief Complaint   Patient presents with    [facility]    Medication Refill        Health Maintenance Due   Topic    Hepatitis C Screening     Colonoscopy     DTaP/Tdap/Td series (1 - Tdap)    PAP AKA CERVICAL CYTOLOGY     Shingrix Vaccine Age 50> (1 of 2)    Breast Cancer Screen Mammogram        Coordination of Care Questionnaire:  :   1) Have you been to an emergency room, urgent care, or hospitalized since your last visit? If yes, where when, and reason for visit? no      2. Have seen or consulted any other health care provider since your last visit? If yes, where when, and reason for visit?  no        Patient is accompanied by self I have received verbal consent from Naren Cardenas to discuss any/all medical information while they are present in the room.

## 2021-04-05 ENCOUNTER — OFFICE VISIT (OUTPATIENT)
Dept: FAMILY MEDICINE CLINIC | Age: 61
End: 2021-04-05
Payer: COMMERCIAL

## 2021-04-05 VITALS
WEIGHT: 175.8 LBS | DIASTOLIC BLOOD PRESSURE: 88 MMHG | BODY MASS INDEX: 28.25 KG/M2 | RESPIRATION RATE: 16 BRPM | HEART RATE: 82 BPM | TEMPERATURE: 98.3 F | SYSTOLIC BLOOD PRESSURE: 141 MMHG | OXYGEN SATURATION: 98 % | HEIGHT: 66 IN

## 2021-04-05 DIAGNOSIS — L30.9 ECZEMA, UNSPECIFIED TYPE: Primary | ICD-10-CM

## 2021-04-05 PROCEDURE — 99213 OFFICE O/P EST LOW 20 MIN: CPT | Performed by: NURSE PRACTITIONER

## 2021-04-05 RX ORDER — LEVOTHYROXINE SODIUM 125 UG/1
TABLET ORAL
COMMUNITY

## 2021-04-05 RX ORDER — TRIAMCINOLONE ACETONIDE 1 MG/G
OINTMENT TOPICAL 2 TIMES DAILY
Qty: 30 G | Refills: 0 | Status: SHIPPED | OUTPATIENT
Start: 2021-04-05

## 2021-04-05 RX ORDER — TRIAMCINOLONE ACETONIDE 1 MG/G
CREAM TOPICAL
COMMUNITY

## 2021-04-05 RX ORDER — TIMOLOL MALEATE 5 MG/ML
SOLUTION/ DROPS OPHTHALMIC
COMMUNITY
Start: 2021-01-25

## 2021-04-05 NOTE — PATIENT INSTRUCTIONS
Please apply Kenalog cream to non skin folds areas. None to face. May Apply Eucerin lotion throughout. Limit baths and hot water. Follow up as needed. Dermatitis: Care Instructions Your Care Instructions Dermatitis is the general name used for any rash or inflammation of the skin. Different kinds of dermatitis cause different kinds of rashes. Common causes of a rash include new medicines, plants (such as poison oak or poison ivy), heat, and stress. Certain illnesses can also cause a rash. An allergic reaction to something that touches your skin, such as latex, nickel, or poison ivy, is called contact dermatitis. Contact dermatitis may also be caused by something that irritates the skin, such as bleach, a chemical, or soap. These types of rashes cannot be spread from person to person. How long your rash will last depends on what caused it. Rashes may last a few days or months. Follow-up care is a key part of your treatment and safety. Be sure to make and go to all appointments, and call your doctor if you are having problems. It's also a good idea to know your test results and keep a list of the medicines you take. How can you care for yourself at home? · Do not scratch the rash. Cut your nails short, and file them smooth. Or wear gloves if this helps keep you from scratching. · Wash the area with water only. Pat dry. · Put cold, wet cloths on the rash to reduce itching. · Keep cool, and stay out of the sun. · Leave the rash open to the air as much as possible. · If the rash itches, use hydrocortisone cream. Follow the directions on the label. Calamine lotion may help for plant rashes. · Take an over-the-counter antihistamine, such as diphenhydramine (Benadryl) or loratadine (Claritin), to help calm the itching. Read and follow all instructions on the label. · If your doctor prescribed a cream, use it as directed. If your doctor prescribed medicine, take it exactly as directed.  
When should you call for help? Call your doctor now or seek immediate medical care if: 
  · You have symptoms of infection, such as: 
? Increased pain, swelling, warmth, or redness. ? Red streaks leading from the area. ? Pus draining from the area. ? A fever.  
  · You have joint pain along with the rash. Watch closely for changes in your health, and be sure to contact your doctor if: 
  · Your rash is changing or getting worse.  
  · You are not getting better as expected. Where can you learn more? Go to http://www.gray.com/ Enter [phone] in the search box to learn more about \"Dermatitis: Care Instructions. \" Current as of: July 2, 2020               Content Version: 12.8 © 2006-2021 [employer]. Care instructions adapted under license by [employer] (which disclaims liability or warranty for this information). If you have questions about a medical condition or this instruction, always ask your healthcare professional. [place] [address] any warranty or liability for your use of this information. Atopic Dermatitis: Care Instructions Your Care Instructions Atopic dermatitis (also called eczema) is a skin problem that causes intense itching and a red, raised rash. In severe cases, the rash develops clear fluidfilled blisters. The rash is not contagious. People with this condition seem to have very sensitive immune systems that are likely to react to things that cause allergies. The immune system is the body's way of fighting infection. There is no cure for atopic dermatitis, but you may be able to control it with care at home. Follow-up care is a key part of your treatment and safety. Be sure to make and go to all appointments, and call your doctor if you are having problems. It's also a good idea to know your test results and keep a list of the medicines you take. How can you care for yourself at home? · Use moisturizer at least twice a day. · If your doctor prescribes a cream, use it as directed. If your doctor prescribes other medicine, take it exactly as directed. · Wash the affected area with water only. Soap can make dryness and itching worse. Pat dry. · Apply a moisturizer after bathing. Use a cream such as Lubriderm, Moisturel, or Cetaphil that does not irritate the skin or cause a rash. Apply the cream while your skin is still damp after lightly drying with a towel. · Use cold, wet cloths to reduce itching. · Keep cool, and stay out of the sun. · If itching affects your normal activities, an over-the-counter antihistamine, such as diphenhydramine (Benadryl) or loratadine (Claritin) may help. Read and follow all instructions on the label. When should you call for help? Call your doctor now or seek immediate medical care if: 
  · Your rash gets worse and you have a fever.  
  · You have new blisters or bruises, or the rash spreads and looks like a sunburn.  
  · You have signs of infection, such as: 
? Increased pain, swelling, warmth, or redness. ? Red streaks leading from the rash. ? Pus draining from the rash. ? A fever.  
  · You have crusting or oozing sores.  
  · You have joint aches or body aches along with your rash. Watch closely for changes in your health, and be sure to contact your doctor if: 
  · Your rash does not clear up after 2 to 3 weeks of home treatment.  
  · Itching interferes with your sleep or daily activities. Where can you learn more? Go to http://www.gray.com/ Enter R059 in the search box to learn more about \"Atopic Dermatitis: Care Instructions. \" Current as of: July 2, 2020               Content Version: 12.8 © 2006-2021 [employer]. Care instructions adapted under license by [employer] (which disclaims liability or warranty for this information).  If you have questions about a medical condition or this instruction, always ask your healthcare professional. [address] any warranty or liability for your use of this information.

## 2021-04-05 NOTE — PROGRESS NOTES
Identified pt with two pt identifiers(name and ). Reviewed record in preparation for visit and have obtained necessary documentation. Chief Complaint   Patient presents with   Yeni Boateng 84     For x2 weeks; left lower leg under buttocks aching, possible bug bite     Other     Discuss dry bilateral hands         Health Maintenance Due   Topic    Hepatitis C Screening     DTaP/Tdap/Td series (1 - Tdap)    PAP AKA CERVICAL CYTOLOGY     Shingrix Vaccine Age 50> (1 of 2)    Colorectal Cancer Screening Combo        Coordination of Care Questionnaire:  :   1) Have you been to an emergency room, urgent care, or hospitalized since your last visit? If yes, where when, and reason for visit? no      2. Have seen or consulted any other health care provider since your last visit? If yes, where when, and reason for visit?  no        Patient is accompanied by self I have received verbal consent from Ady Cooper to discuss any/all medical information while they are present in the room.

## 2021-04-06 NOTE — PROGRESS NOTES
Jacinda Porter is a 62 y.o. female who presents to clinic today for the following:    Chief Complaint   Patient presents with   Yeni Boateng 84     For x2 weeks; left lower leg under buttocks aching, possible bug bite     Other     Discuss dry bilateral hands        Vitals:    04/05/21 1619   BP: (!) 141/88   Pulse: 82   Resp: 16   Temp: 98.3 °F (36.8 °C)   TempSrc: Temporal   SpO2: 98%   Weight: 175 lb 12.8 oz (79.7 kg)   Height: 5' 6\" (1.676 m)       Body mass index is 28.37 kg/m². Patients past medical, surgical and family histories were reviewed. Allergies and Medications reviewed and updated. Current Outpatient Medications:     triamcinolone acetonide (KENALOG) 0.1 % topical cream, triamcinolone acetonide 0.1 % topical cream  MOLINA THIN LAYER EXT AA BID, Disp: , Rfl:     timolol (TIMOPTIC) 0.5 % ophthalmic solution, INSTILL 1 DROP INTO EACH EYE ONCE DAILY EVERY MORNING, Disp: , Rfl:     levothyroxine (SYNTHROID) 125 mcg tablet, levothyroxine 125 mcg tablet, Disp: , Rfl:     mineral oil-isopropyl myristat (EUCERIN) lotion, Apply  to affected area as needed for Dry Skin., Disp: 1 Bottle, Rfl: 2    triamcinolone acetonide (KENALOG) 0.1 % ointment, Apply  to affected area two (2) times a day. use thin layer, Disp: 30 g, Rfl: 0    latanoprost (XALATAN) 0.005 % ophthalmic solution, Administer 1 Drop to both eyes nightly., Disp: , Rfl:     traZODone (DESYREL) 50 mg tablet, TAKE 1 TABLET BY MOUTH EVERY NIGHT, Disp: 90 Tab, Rfl: 3    amLODIPine (NORVASC) 5 mg tablet, TAKE 1 TABLET BY MOUTH DAILY, Disp: 90 Tab, Rfl: 11    No Known Allergies    Past Medical History:   Diagnosis Date    Glaucoma     HTN (hypertension) 01/07/2019    Lesion of lip 01/07/2019    S/P colonoscopy 2012    at [place]    Stress at work 01/07/2019    Thyroid disease     Wellness examination 01/07/2019       History reviewed. No pertinent surgical history.     Family History   Problem Relation Age of Onset    Stroke Mother     Hypertension Mother     Hypertension Father     Diabetes Father        Social History     Socioeconomic History    Marital status: SINGLE     Spouse name: Not on file    Number of children: Not on file    Years of education: Not on file    Highest education level: Not on file   Occupational History    Not on file   Social Needs    Financial resource strain: Not on file    Food insecurity     Worry: Not on file     Inability: Not on file    Transportation needs     Medical: Not on file     Non-medical: Not on file   Tobacco Use    Smoking status: Never Smoker    Smokeless tobacco: Never Used   Substance and Sexual Activity    Alcohol use: No     Frequency: Never    Drug use: No    Sexual activity: Yes     Partners: Male   Lifestyle    Physical activity     Days per week: Not on file     Minutes per session: Not on file    Stress: Not on file   Relationships    Social connections     Talks on phone: Not on file     Gets together: Not on file     Attends Holiness service: Not on file     Active member of club or organization: Not on file     Attends meetings of clubs or organizations: Not on file     Relationship status: Not on file    Intimate partner violence     Fear of current or ex partner: Not on file     Emotionally abused: Not on file     Physically abused: Not on file     Forced sexual activity: Not on file   Other Topics Concern    Not on file   Social History Narrative    Habits:  Patient is a lifetime nonsmoker, non drinker, non drug abuser.         Social History:  The patient is  since [address] [facility]. She has a 29year old son and an 25year old daughter and one grandchild. She completed her bachelor's degree at [employer] in business management. 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   She is a member of [employer].         Family History:  Father is 80 with PTSD, diabetes, and is at the [facility] currently. Mother is 68 with a stroke and hypertension. One sister, one brother, alive and well. Physical Exam  Vitals signs and nursing note reviewed. Constitutional:       Appearance: She is obese. HENT:      Head: Normocephalic. Nose: Nose normal.      Mouth/Throat:      Mouth: Mucous membranes are moist.      Pharynx: Oropharynx is clear. Eyes:      Extraocular Movements: Extraocular movements intact. Conjunctiva/sclera: Conjunctivae normal.      Pupils: Pupils are equal, round, and reactive to light. Neck:      Musculoskeletal: Normal range of motion. Cardiovascular:      Rate and Rhythm: Normal rate and regular rhythm. Pulses: Normal pulses. Heart sounds: Normal heart sounds. Pulmonary:      Effort: Pulmonary effort is normal.      Breath sounds: Normal breath sounds. Abdominal:      General: Abdomen is flat. Musculoskeletal: Normal range of motion. Skin:     Capillary Refill: Capillary refill takes less than 2 seconds. Findings: Lesion and rash present. Neurological:      General: No focal deficit present. Mental Status: She is alert and oriented to person, place, and time. Psychiatric:         Mood and Affect: Mood normal.         Behavior: Behavior normal.          Pt seen in office for 2 week hx of rash on hands which appear to be eczema and two lesions  below buttucks on upper left leg. She reports these were painful lesions that had a blister but are now dry. On exam with my nurse in room, there were two lesions, dry, scabbed. Possible shingles based on line pattern and pain. Has been 2 weeks and are no longer painful or blistered. Will not treat at this time. For her Eczema, I have provided two prescriptions, discussed avoiding frequent baths and hot water, no fragrant lotions, shampoos. Follow up as needed. Review of Systems   Constitutional: Negative for fever and malaise/fatigue.    HENT: Negative for congestion, sinus pain and sore throat. Respiratory: Negative for cough and shortness of breath. Cardiovascular: Negative for chest pain. Gastrointestinal: Negative for diarrhea, nausea and vomiting. Genitourinary: Negative for dysuria. Musculoskeletal: Negative. Skin: Positive for itching and rash. Neurological: Negative for dizziness and headaches. Endo/Heme/Allergies: Negative for environmental allergies. Psychiatric/Behavioral: Negative. No results found. No results found for this or any previous visit (from the past 24 hour(s)). Assessment and Plan:    Encounter Diagnoses   Name Primary?  Eczema, unspecified type Yes                I have discussed the diagnosis with the patient and the intended plan as seen in the above orders. she has expressed understanding. The patient has received an after-visit summary and questions were answered concerning future plans. I have discussed medication side effects and warnings with the patient as well. Follow-up and Dispositions    · Return if symptoms worsen or fail to improve.          Electronically Signed: Francisco Mcclelland NP

## 2022-03-18 PROBLEM — I10 HTN (HYPERTENSION): Status: ACTIVE | Noted: 2019-01-07

## 2022-03-18 PROBLEM — Z56.6 STRESS AT WORK: Status: ACTIVE | Noted: 2019-01-07

## 2022-03-19 PROBLEM — K13.0 LESION OF LIP: Status: ACTIVE | Noted: 2019-01-07

## 2023-05-20 RX ORDER — TRAZODONE HYDROCHLORIDE 50 MG/1
1 TABLET ORAL NIGHTLY
COMMUNITY
Start: 2019-01-29

## 2023-05-20 RX ORDER — AMLODIPINE BESYLATE 5 MG/1
1 TABLET ORAL DAILY
COMMUNITY
Start: 2019-01-07

## 2023-05-20 RX ORDER — TIMOLOL MALEATE 5 MG/ML
SOLUTION/ DROPS OPHTHALMIC
COMMUNITY
Start: 2021-01-25

## 2023-05-20 RX ORDER — LATANOPROST 50 UG/ML
1 SOLUTION/ DROPS OPHTHALMIC
COMMUNITY

## 2023-05-20 RX ORDER — LEVOTHYROXINE SODIUM 0.12 MG/1
TABLET ORAL
COMMUNITY

## 2023-05-20 RX ORDER — TRIAMCINOLONE ACETONIDE 1 MG/G
CREAM TOPICAL
COMMUNITY